# Patient Record
Sex: FEMALE | Race: WHITE
[De-identification: names, ages, dates, MRNs, and addresses within clinical notes are randomized per-mention and may not be internally consistent; named-entity substitution may affect disease eponyms.]

---

## 2018-04-18 ENCOUNTER — HOSPITAL ENCOUNTER (OUTPATIENT)
Dept: HOSPITAL 24 - MED/SURG | Age: 53
Setting detail: OBSERVATION
LOS: 1 days | Discharge: HOME | End: 2018-04-19
Attending: INTERNAL MEDICINE | Admitting: INTERNAL MEDICINE
Payer: COMMERCIAL

## 2018-04-18 VITALS — BODY MASS INDEX: 21 KG/M2

## 2018-04-18 DIAGNOSIS — K64.0: ICD-10-CM

## 2018-04-18 DIAGNOSIS — D50.8: Primary | ICD-10-CM

## 2018-04-18 LAB
ALBUMIN SERPL BCP-MCNC: 3.7 G/DL (ref 3.4–5)
ALP SERPL-CCNC: 109 UNITS/L (ref 46–116)
ALT SERPL W P-5'-P-CCNC: 20 UNITS/L (ref 12–78)
AST SERPL W P-5'-P-CCNC: 30 UNITS/L (ref 15–37)
BASOPHILS # BLD AUTO: 0.1 X10^3/UL (ref 0–0.1)
BASOPHILS NFR BLD AUTO: 1.2 % (ref 0.2–1)
BILIRUB UR QL STRIP.AUTO: NEGATIVE
BUN SERPL-MCNC: 15 MG/DL (ref 7–18)
CALCIUM ALBUM COR SERPL-SCNC: (no result) MG/DL (ref 8.5–10.1)
CALCIUM ALBUM COR SERPL-SCNC: (no result) MMOL/L (ref 136–145)
CALCIUM SERPL-MCNC: 8.6 MG/DL (ref 8.5–10.1)
CHLORIDE SERPL-SCNC: 102 MMOL/L (ref 98–107)
CLARITY UR: CLEAR
CO2 SERPL-SCNC: 28.3 MMOL/L (ref 21–32)
COLOR UR AUTO: YELLOW
CREAT SERPL-MCNC: 0.78 MG/DL (ref 0.55–1.02)
EGFR  BLACK RACES: > 60 (ref 60–?)
EOSINOPHIL # BLD AUTO: 0.1 X10^3/UL (ref 0–0.2)
EOSINOPHIL NFR BLD AUTO: 1 % (ref 0.9–2.9)
ERYTHROCYTE [DISTWIDTH] IN BLOOD BY AUTOMATED COUNT: 20.2 % (ref 11.6–16.5)
GIANT PLATELETS BLD QL SMEAR: (no result)
GLUCOSE UR QL STRIP.AUTO: NEGATIVE
HCT VFR BLD AUTO: 22.7 % (ref 36–47)
HGB BLD-MCNC: 7 G/DL (ref 12–16)
KETONES UR QL STRIP.AUTO: NEGATIVE
LEUKOCYTE ESTERASE UR QL STRIP.AUTO: NEGATIVE
LYMPHOCYTES # BLD AUTO: 2.2 X10^3/UL (ref 1.3–2.9)
LYMPHOCYTES NFR BLD AUTO: 39.7 % (ref 21–51)
MCH RBC QN AUTO: 18.7 PG (ref 27–34)
MCHC RBC AUTO-ENTMCNC: 30.6 G/DL (ref 33–35)
MCV RBC AUTO: 61.2 FL (ref 80–100)
METAMYELOCYTES NFR BLD: 2 %
MONOCYTES # BLD AUTO: 0.4 X10^3/UL (ref 0.3–0.8)
MONOCYTES NFR BLD AUTO: 7 % (ref 0–13)
NEUTROPHILS # BLD AUTO: 2.8 X10^3/UL (ref 2.2–4.8)
NEUTROPHILS NFR BLD AUTO: 51.1 % (ref 42–75)
NEUTS BAND NFR BLD: 2 % (ref 0–10)
NITRITE UR QL STRIP.AUTO: NEGATIVE
PH UR STRIP.AUTO: 6.5 [PH] (ref 5–8)
PLAT MORPH BLD: (no result)
PLATELET # BLD: 284 X10^3/UL (ref 150–450)
PMV BLD AUTO: 8.6 FL (ref 7.4–11)
PROT SERPL-MCNC: 7.8 G/DL (ref 6.4–8.2)
PROT UR QL STRIP.AUTO: NEGATIVE
RBC # BLD AUTO: 3.71 X10^6/UL (ref 3.5–5.4)
RBC # UR STRIP.AUTO: NEGATIVE /UL
RBC MORPH BLD: (no result)
RBC MORPH BLD: SLIGHT
SODIUM SERPL-SCNC: 137 MMOL/L (ref 136–145)
SP GR UR STRIP.AUTO: 1.01 (ref 1–1.03)
UROBILINOGEN UR QL STRIP.AUTO: NORMAL
WBC NRBC COR # BLD AUTO: 5.4 X10^3/UL (ref 3.6–10)

## 2018-04-18 PROCEDURE — 36430 TRANSFUSION BLD/BLD COMPNT: CPT

## 2018-04-18 PROCEDURE — C9113 INJ PANTOPRAZOLE SODIUM, VIA: HCPCS

## 2018-04-18 PROCEDURE — 87336 ENTAMOEB HIST DISPR AG IA: CPT

## 2018-04-18 PROCEDURE — 86901 BLOOD TYPING SEROLOGIC RH(D): CPT

## 2018-04-18 PROCEDURE — 80053 COMPREHEN METABOLIC PANEL: CPT

## 2018-04-18 PROCEDURE — 94760 N-INVAS EAR/PLS OXIMETRY 1: CPT

## 2018-04-18 PROCEDURE — 82274 ASSAY TEST FOR BLOOD FECAL: CPT

## 2018-04-18 PROCEDURE — 86900 BLOOD TYPING SEROLOGIC ABO: CPT

## 2018-04-18 PROCEDURE — 36415 COLL VENOUS BLD VENIPUNCTURE: CPT

## 2018-04-18 PROCEDURE — A4217 STERILE WATER/SALINE, 500 ML: HCPCS

## 2018-04-18 PROCEDURE — 85025 COMPLETE CBC W/AUTO DIFF WBC: CPT

## 2018-04-18 PROCEDURE — G0378 HOSPITAL OBSERVATION PER HR: HCPCS

## 2018-04-18 PROCEDURE — 87493 C DIFF AMPLIFIED PROBE: CPT

## 2018-04-18 PROCEDURE — 81003 URINALYSIS AUTO W/O SCOPE: CPT

## 2018-04-18 PROCEDURE — 86922 COMPATIBILITY TEST ANTIGLOB: CPT

## 2018-04-18 PROCEDURE — 87449 NOS EACH ORGANISM AG IA: CPT

## 2018-04-18 PROCEDURE — 87329 GIARDIA AG IA: CPT

## 2018-04-18 PROCEDURE — 87427 SHIGA-LIKE TOXIN AG IA: CPT

## 2018-04-18 PROCEDURE — 76856 US EXAM PELVIC COMPLETE: CPT

## 2018-04-18 PROCEDURE — 83630 LACTOFERRIN FECAL (QUAL): CPT

## 2018-04-18 PROCEDURE — 87328 CRYPTOSPORIDIUM AG IA: CPT

## 2018-04-18 PROCEDURE — 87045 FECES CULTURE AEROBIC BACT: CPT

## 2018-04-18 PROCEDURE — S0028 INJECTION, FAMOTIDINE, 20 MG: HCPCS

## 2018-04-18 PROCEDURE — 86850 RBC ANTIBODY SCREEN: CPT

## 2018-04-18 RX ADMIN — FAMOTIDINE SCH MLS/HR: 20 INJECTION, SOLUTION INTRAVENOUS at 17:37

## 2018-04-18 RX ADMIN — SODIUM CHLORIDE SCH ML: 9 INJECTION, SOLUTION INTRAMUSCULAR; INTRAVENOUS; SUBCUTANEOUS at 22:50

## 2018-04-18 RX ADMIN — SODIUM CHLORIDE SCH ML: 9 INJECTION, SOLUTION INTRAMUSCULAR; INTRAVENOUS; SUBCUTANEOUS at 17:36

## 2018-04-18 RX ADMIN — PANTOPRAZOLE SODIUM SCH MG: 40 INJECTION, POWDER, FOR SOLUTION INTRAVENOUS at 20:29

## 2018-04-18 RX ADMIN — FAMOTIDINE SCH MLS/HR: 20 INJECTION, SOLUTION INTRAVENOUS at 20:29

## 2018-04-18 RX ADMIN — SODIUM CHLORIDE SCH MLS/HR: 9 INJECTION, SOLUTION INTRAVENOUS at 17:36

## 2018-04-18 RX ADMIN — PANTOPRAZOLE SODIUM SCH MG: 40 INJECTION, POWDER, FOR SOLUTION INTRAVENOUS at 17:36

## 2018-04-19 VITALS — DIASTOLIC BLOOD PRESSURE: 91 MMHG | SYSTOLIC BLOOD PRESSURE: 155 MMHG

## 2018-04-19 LAB
ALBUMIN SERPL BCP-MCNC: 3.3 G/DL (ref 3.4–5)
ALP SERPL-CCNC: 107 UNITS/L (ref 46–116)
ALT SERPL W P-5'-P-CCNC: 21 UNITS/L (ref 12–78)
AST SERPL W P-5'-P-CCNC: 23 UNITS/L (ref 15–37)
BASOPHILS # BLD AUTO: 0.1 X10^3/UL (ref 0–0.1)
BASOPHILS NFR BLD AUTO: 1.4 % (ref 0.2–1)
BUN SERPL-MCNC: 12 MG/DL (ref 7–18)
C PARVUM AG STL QL IA: NEGATIVE
CALCIUM ALBUM COR SERPL-SCNC: (no result) MMOL/L (ref 136–145)
CALCIUM ALBUM COR SERPL-SCNC: 8.8 MG/DL (ref 8.5–10.1)
CALCIUM SERPL-MCNC: 8.2 MG/DL (ref 8.5–10.1)
CHLORIDE SERPL-SCNC: 106 MMOL/L (ref 98–107)
CO2 SERPL-SCNC: 25.9 MMOL/L (ref 21–32)
CREAT SERPL-MCNC: 0.71 MG/DL (ref 0.55–1.02)
EGFR  BLACK RACES: > 60 (ref 60–?)
EOSINOPHIL # BLD AUTO: 0.1 X10^3/UL (ref 0–0.2)
EOSINOPHIL NFR BLD AUTO: 2 % (ref 0.9–2.9)
ERYTHROCYTE [DISTWIDTH] IN BLOOD BY AUTOMATED COUNT: 22 % (ref 11.6–16.5)
G LAMBLIA AG STL QL IA: NEGATIVE
HCT VFR BLD AUTO: 30.7 % (ref 36–47)
HGB BLD-MCNC: 9.6 G/DL (ref 12–16)
LYMPHOCYTES # BLD AUTO: 2.7 X10^3/UL (ref 1.3–2.9)
LYMPHOCYTES NFR BLD AUTO: 40 % (ref 21–51)
MCH RBC QN AUTO: 20.5 PG (ref 27–34)
MCHC RBC AUTO-ENTMCNC: 31.5 G/DL (ref 33–35)
MCV RBC AUTO: 65 FL (ref 80–100)
MONOCYTES # BLD AUTO: 0.7 X10^3/UL (ref 0.3–0.8)
MONOCYTES NFR BLD AUTO: 10.5 % (ref 0–13)
NEUTROPHILS # BLD AUTO: 3.1 X10^3/UL (ref 2.2–4.8)
NEUTROPHILS NFR BLD AUTO: 46.1 % (ref 42–75)
PLAT MORPH BLD: NORMAL
PLATELET # BLD: 277 X10^3/UL (ref 150–450)
PMV BLD AUTO: 8.8 FL (ref 7.4–11)
PROT SERPL-MCNC: 7.3 G/DL (ref 6.4–8.2)
RBC # BLD AUTO: 4.71 X10^6/UL (ref 3.5–5.4)
RBC MORPH BLD: (no result)
RBC MORPH BLD: PRESENT
RBC MORPH BLD: PRESENT
SODIUM SERPL-SCNC: 139 MMOL/L (ref 136–145)
STOOL FOR WBC: NEGATIVE
WBC NRBC COR # BLD AUTO: 6.7 X10^3/UL (ref 3.6–10)

## 2018-04-19 PROCEDURE — 0DJD8ZZ INSPECTION OF LOWER INTESTINAL TRACT, VIA NATURAL OR ARTIFICIAL OPENING ENDOSCOPIC: ICD-10-PCS | Performed by: INTERNAL MEDICINE

## 2018-04-19 RX ADMIN — SODIUM CHLORIDE SCH: 9 INJECTION, SOLUTION INTRAVENOUS at 05:50

## 2018-04-19 RX ADMIN — FAMOTIDINE SCH MLS/HR: 20 INJECTION, SOLUTION INTRAVENOUS at 10:06

## 2018-04-19 RX ADMIN — SODIUM CHLORIDE SCH: 9 INJECTION, SOLUTION INTRAMUSCULAR; INTRAVENOUS; SUBCUTANEOUS at 05:50

## 2018-04-19 RX ADMIN — PANTOPRAZOLE SODIUM SCH MG: 40 INJECTION, POWDER, FOR SOLUTION INTRAVENOUS at 10:06

## 2018-04-19 RX ADMIN — SODIUM CHLORIDE SCH ML: 9 INJECTION, SOLUTION INTRAMUSCULAR; INTRAVENOUS; SUBCUTANEOUS at 14:43

## 2018-04-19 NOTE — US
History: Paraumbilical pain



Study: Transabdominal ultrasound of the pelvis



Comparison: None



Findings: The uterus measures 7.03 x 2.94 x 3.96 cm. The endometrium measures 4.4 mm thickness. The r
ight ovary measures 1.95 x 1.16 x 1.45 cm without mass. The left ovary measures 2.93 x 1.61 x 2.27 cm
 without mass. There is no free fluid.



Impression: Negative



Reported By:Electronically Signed by HODA AMARAL MD at 4/19/2018 4:39:04 PM

## 2018-04-19 NOTE — DR.UPDATE
H&P Update


History and Physical Update: 


History and Physical reviewed and patient examined.





Changes noted: NO       Yes with the following:


 RETURNED TO THE OFFICE TODAY FOR A FOLLOW-UP ON LABS. SHE REPORTS 

COMPLAINTS OF FATIGUE, GENERALIZED WEAKNESS, AND DIFFUSE ABDOMINAL PAIN. LABS 

REVEALED THAT HER HEMOGLOBIN WAS 7.1g/dL. PATIENT DENIES BLACK OR TARRY STOOLS 

OR HEMOPTYSIS. SHE DOES REPORT A SMALL AMOUNT OF VAGINAL BLEEDING AT TIMES. 

PATIENT WILL BE ADMITTED FOR FURTHER EVALUATION AND TREATMENT. WE WILL TYPE AND 

SCREEN AND TRANSFUSE TWO UNITS OF PACKED RED BLOOD CELLS ON ADMISSION. WE WILL 

CONSULT GASTROENTEROLOGY AND ORDER A PELVIC ULTRASOUND, LABS, AND STOOL 

STUDIES. OTHERWISE, WE WILL FOLLOW UP WITH AM LABS AND CONTINUE TO MONTIOR 

PATIENT.

## 2018-04-19 NOTE — DR.CONSULT
Consult





- Consultation for Day of:


Date: 04/18/18





- Chief Complaint


Chief Complaint: Patient referred for anemia. Patient with no GI complaints 

other than occasional epigastric pain.





- Allergies


Allergies/Adverse Reactions: 


 Allergies











Allergy/AdvReac Type Severity Reaction Status Date / Time


 


No Known Drug Allergies Allergy   Verified 04/18/18 15:54














- History of Present Illness


History of Present Illness: Patient is a 51yo female who was referred for 

anemia. Patient with no GI complaints other than occasional epigastric pain. 

She denies dysphagia, dyspepsia, nausea, vomiting, abdominal pain, constipation

, diarrhea, melena and hematochezia. She states she has never had a colonscopy 

or EGD. Hgb was 7.0 on arrival. Patient states she is health and does not have 

any medical problems.





- Past Surgical History


Surgical History: Tonsillectomy, Other





- Family History


Family Medical History: Cancer, MI, Hypertension





- Social History


Does patient currently use any type of tobacco product: Yes


Have you used tobacco products in the last 12 months: Yes


Type of Tobacco Use: Cigarettes


How many years tobacco product used: 12


Does any household member use tobacco: Yes ()


Alcohol Use: None


Drug Use: Prescription Drugs





- Medications


Home Medications: 


 





Buprenorphine HCl/Naloxone HCl [Suboxone film 8-2 mg] 1 film PO BID 04/18/18 [

History Confirmed 04/18/18]











- Review of Systems


Constitutional: Weakness


Eyes: No Symptoms Reported


ENT: No Symptoms Reported


Respiratory: No Symptoms Reported


Cardiovascular: No Symptoms Reported


Gastrointestinal: No Symptoms Reported


Genitourinary: No Symptoms Reported


Musculoskeletal: No Symptoms Reported


Skin: No Symptoms Reported


Neurological: No Symptoms Reported





- Physical Exam


Vital Signs: 


 





Temperature                      98.2 F


Pulse Rate [Right Brachial]      67


Respiratory Rate                 16


Blood Pressure [Right Arm]       142/81


O2 Sat by Pulse Oximetry         96








Oriented: Normal


Eyes: Normal


Ear: Normal


Nose: Normal


Throat: Normal


Respiratory: Clear Throughout


Cardiovascular: Normal


Auscultation: Bowel Sounds: Normal


Palpation: Normal, Other (no distention).  negative: Spleen Enlarged, Liver 

Enlarged, Mass Pulsatile


Tenderness: Normal


Skin: Normal


Musculoskeletal: Normal


Psychiatric: Normal


Mood Description: Calm


Affect: Normal


Speech Pattern: Clear, Appropriate





- Plan


Plan: Assessment.  1. New onset anemia r/o GI loss upper vs lower.  Plan.  1. 

Monitor Hgb, Transfuse as needed, Colonoscopy in am, Cont protonix IV.  Plan 

reviewed with Dr. Vizcarra